# Patient Record
(demographics unavailable — no encounter records)

---

## 2025-03-04 NOTE — PHYSICAL EXAM
[FreeTextEntry1] : Examination today with his knee immobilizer off reveals a large effusion to the knee joint with restricted motion in both extension and flexion.  He has no obvious instability on stress of the collaterals a Lachman appears to be mildly positive anterior and posterior drawer could not be adequately assessed because of significant pain and spasm present.  He has tenderness in both medial lateral compartments.  Popliteal fossa calf and neurovascular exam are negative.  All compartments are soft.   4 views left knee [facility] March 2, 2025 reviewed interpreted by myself reveal no obvious bony abnormality no loose body/lesion

## 2025-03-04 NOTE — PROCEDURE
[de-identified] : After appropriate timeout and consent was obtained.  Under sterile technique with a Betadine prep the left knee was aspirated from a medial patellofemoral approach revealing approximately 45 cc of cathryn blood on aspiration with a Band-Aid dressing applied at the conclusion this was tolerated without incident.

## 2025-03-04 NOTE — ASSESSMENT
[FreeTextEntry1] : Impression: Internal derangement right knee rule out meniscal/ligamentous tear.  Acute hemarthrosis.  This patient will continue with his crutches nonweightbearing with motion exercises and isometrics.  Obviously no gym/sports until further notice.  MRI of the knee has been ordered.  He will return the end of next week for further evaluation

## 2025-03-04 NOTE — CONSULT LETTER
[Dear  ___] : Dear  [unfilled], [Consult Letter:] : I had the pleasure of evaluating your patient, [unfilled]. [Please see my note below.] : Please see my note below. [Consult Closing:] : Thank you very much for allowing me to participate in the care of this patient.  If you have any questions, please do not hesitate to contact me. [Sincerely,] : Sincerely, [FreeTextEntry3] : Dr Buster Valerio JR.

## 2025-03-04 NOTE — HISTORY OF PRESENT ILLNESS
[FreeTextEntry1] : This 14-year-old healthy adolescent with normal development is seen today for evaluation of his left knee.  He was well until 3 days ago when he sustained injury playing soccer.  This precipitated acute significant pain swelling stiffness and difficulty bearing weight.  He was seen in the emergency room at [facility] March 2 and sent home on crutches with a knee immobilizer in place.  He is still quite uncomfortable.  Prior to this no significant complaints past history is noncontributory

## 2025-03-13 NOTE — PHYSICAL EXAM
[FreeTextEntry1] : On exam much less effusion to the knee is noted he is much more comfortable he has motion from 5 degrees-95.  Lachman questionably mildly positive anterior and posterior drawer appear to be negative.  Collaterals are intact he has much less tenderness about the knee altogether

## 2025-03-13 NOTE — HISTORY OF PRESENT ILLNESS
[FreeTextEntry1] : This 14-year-old returns for follow-up of his left knee injury.  He is improving subjectively he just did have his MRI official report pending.

## 2025-03-13 NOTE — ASSESSMENT
[FreeTextEntry1] : Impression: Internal derangement left knee.  Will discontinue the knee immobilizer begin range of motion isometrics.  Physical therapy to begin as well we will continue nonweightbearing until he has good motion and is able to straight leg raise.  I will see him in 3 weeks for follow-up I will call mom when the official results of the MRI study are available

## 2025-04-03 NOTE — HISTORY OF PRESENT ILLNESS
[FreeTextEntry1] : This 14-year-old returns for follow-up of his left knee.  His MRI did demonstrate a sleeve fracture of the patella.  He has been using his knee immobilizer and is very comfortable

## 2025-04-03 NOTE — ASSESSMENT
[FreeTextEntry1] : Impression: Sleeve fracture left patella.  He will continue with the knee immobilizer full-time for 10 days following which she will discontinue the brace continue with crutches however he will begin physical therapy at that time.  I will see him in 4 weeks with x-rays of the left knee

## 2025-04-03 NOTE — PHYSICAL EXAM
[FreeTextEntry1] : Examination today reveals much less swelling to the knee minimal effusion at this time he has much less tenderness about the extensor mechanism specifically the inferior pole of the patella tendon and tibial tubercle.  He is able to straight leg raise he is able to bend comfortably to approximately 75 degrees.  Cruciates and collaterals are intact to stress calf is nontender.  X-rays ordered and taken today 2 views of the left knee reviewed interpreted by myself reveal ongoing healing of his sleeve fracture in good position

## 2025-05-01 NOTE — HISTORY OF PRESENT ILLNESS
[FreeTextEntry1] : This 14-year-old adolescent returns today for evaluation of the left knee.  He is doing much better he has no significant complaints at this time

## 2025-05-01 NOTE — PHYSICAL EXAM
[FreeTextEntry1] : On exam he is walking with 1 crutch without any difficulty he has good motion to the knee minimal effusion he has no significant tenderness over the inferior pole of the patella patella tendon the extensor mechanism is clearly intact though he still has atrophy to the muscle.  The knee is stable to stress.  X-rays ordered and taken today 2 views of the left knee reviewed interpreted by myself ongoing healing of the sleeve fracture

## 2025-05-01 NOTE — ASSESSMENT
[FreeTextEntry1] : Impression: Sleeve fracture left patella.  Continue with physical therapy no sports as yet return in 4 weeks

## 2025-05-30 NOTE — HISTORY OF PRESENT ILLNESS
[FreeTextEntry1] : This 14 returns for follow-up of his left knee injury.  Much improved at this time no significant complaints

## 2025-05-30 NOTE — PHYSICAL EXAM
[FreeTextEntry1] : Examination today normal gait no limp full motion to the knee no effusion no tenderness to palpation about the patella.  Strength of the extremity has improved significantly

## 2025-05-30 NOTE — ASSESSMENT
[FreeTextEntry1] : Impression: Sleeve fracture left patella.  He will discontinue therapy he will be allowed to return to his sports in 2 weeks time return here as needed

## 2025-06-11 NOTE — PHYSICAL EXAM
[FreeTextEntry1] : On exam he has no significant limp of concern he has full motion to the left knee the knee is stable to stress of the cruciate/collaterals he has no pain to the inferior pole of the patella patella tendon he does have mild pain over the distal patella tendon tibial tubercle region no defect noted extensor mechanism is intact.  He does have some atrophy to his quadriceps mechanism.  X-rays ordered and taken today 4 views AP lateral skyline and notch views of the left knee reviewed interpreted by myself no acute fractures noted

## 2025-06-11 NOTE — ASSESSMENT
[FreeTextEntry1] : Impression: Acute sprain left knee.  I have advised to go back to physical therapy to maximize strength of his thigh.  I have advised light activities no soccer on the order of 1 month he will return on appearing basis mother has been made aware the mild swelling and discomfort over the tibial tubercle will slowly resolve as he continues to review skeletal growth

## 2025-06-11 NOTE — HISTORY OF PRESENT ILLNESS
[FreeTextEntry1] : This 14-year-old comes in today 2 days ago he was playing soccer he was dribbling the ball his left knee was planted another student who that he did not see side tackled him and he sustained injury.  This precipitated pain mild swelling stiffness and limp.  He has been improving since then.  He is status posttreatment for a sleeve fracture of this patella and also was noted to have residuals of Osgood-Schlatter's.  No locking or buckling